# Patient Record
Sex: MALE | Race: WHITE | Employment: FULL TIME | URBAN - METROPOLITAN AREA
[De-identification: names, ages, dates, MRNs, and addresses within clinical notes are randomized per-mention and may not be internally consistent; named-entity substitution may affect disease eponyms.]

---

## 2020-10-28 ENCOUNTER — OFFICE VISIT (OUTPATIENT)
Dept: ORTHOPEDIC SURGERY | Age: 45
End: 2020-10-28
Payer: MEDICAID

## 2020-10-28 VITALS — WEIGHT: 220 LBS | HEIGHT: 72 IN | TEMPERATURE: 98 F | BODY MASS INDEX: 29.8 KG/M2

## 2020-10-28 PROCEDURE — 4004F PT TOBACCO SCREEN RCVD TLK: CPT | Performed by: ORTHOPAEDIC SURGERY

## 2020-10-28 PROCEDURE — G8427 DOCREV CUR MEDS BY ELIG CLIN: HCPCS | Performed by: ORTHOPAEDIC SURGERY

## 2020-10-28 PROCEDURE — 99203 OFFICE O/P NEW LOW 30 MIN: CPT | Performed by: ORTHOPAEDIC SURGERY

## 2020-10-28 PROCEDURE — G8419 CALC BMI OUT NRM PARAM NOF/U: HCPCS | Performed by: ORTHOPAEDIC SURGERY

## 2020-10-28 PROCEDURE — G8484 FLU IMMUNIZE NO ADMIN: HCPCS | Performed by: ORTHOPAEDIC SURGERY

## 2020-10-28 RX ORDER — AMOXICILLIN AND CLAVULANATE POTASSIUM 875; 125 MG/1; MG/1
TABLET, FILM COATED ORAL
COMMUNITY
Start: 2020-09-23 | End: 2022-10-10

## 2020-10-28 RX ORDER — NAPROXEN 500 MG/1
TABLET ORAL
COMMUNITY
Start: 2020-09-23 | End: 2022-10-10

## 2020-10-28 NOTE — PROGRESS NOTES
Alcohol use: Not on file    Drug use: Not on file    Sexual activity: Not on file   Lifestyle    Physical activity     Days per week: Not on file     Minutes per session: Not on file    Stress: Not on file   Relationships    Social connections     Talks on phone: Not on file     Gets together: Not on file     Attends Jehovah's witness service: Not on file     Active member of club or organization: Not on file     Attends meetings of clubs or organizations: Not on file     Relationship status: Not on file    Intimate partner violence     Fear of current or ex partner: Not on file     Emotionally abused: Not on file     Physically abused: Not on file     Forced sexual activity: Not on file   Other Topics Concern    Not on file   Social History Narrative    Not on file     No family history on file. Physical Exam:    Temp 98 °F (36.7 °C)   Ht 6' (1.829 m)   Wt 220 lb (99.8 kg)   BMI 29.84 kg/m²     GENERAL: alert, appears stated age, cooperative, no acute distress    HEENT: Head is normocephalic, atraumatic. PERRLA. SKIN: Clean, dry, intact. There is not any cellulitis or cutaneous lesions noted in the upper extremities    PULMONARY: breathing is regular and unlabored, no acute distress    CV: The bilateral upper and lower extremities are warm and well-perfused with brisk capillary refill. 2+ pulses UE and LE bilateral.     PSYCHIATRY: Pleasant mood, appropriate behavior, follows commands    NEURO: Sensation is intact distally with light touch with no alteration. Motor exam of the upper extremities show elbow flexion and extension, wrist flexion and extension, and finger abduction grossly intact 5/5. Upper extremity reflexes are bilaterally symmetrical and within normal limits. LYMPH: No lymphedema present distally in upper or lower extremity. MUSCULOSKELETAL:  Shoulder Exam:  Examination of the Left shoulder shows: There is not a deformity. There is not erythema.   There is not soft tissue swelling. Deltoid region is  tender to palpation. AC Joint is  tender to palpation. Clavicle is not tender to palpation. Bicipital Groove is  tender to palpation. Pectoralis  is not tender to palpation. Scapula/ trapezius is  tender to palpation. Right:  ROM Full, Strength: Supraspinatus 5/5, Infraspinatus 5/5, Subscapularis 5/5  Left:  /140/60, Strength: Supraspinatus 4/5, Infraspinatus 5/5, Subscapularis 5/5  Right Shoulder:  Crepitus:  no   Tenderness:  none   Effusion:   none   Impingement: negative   Empty Can:  negative   Speed's:  negative      Apprehension:  negative   Cross Arm Sign:  negative   Campbell's:  negative       Neer's:  negative      Belly Press Test:  negative      Drop Arm Test:  negative     Left Shoulder:  Crepitus:  no   Tenderness:  mild   Effusion:   non   Impingement: positive   Empty Can:  positive   Speed's: positive   Apprehension:  not tested   Cross Arm Sign:  positive   Campbell's:  positive      Neer's:  positive      Belly Press Test:  negative   Drop Arm Test:  positive           Imaging:  Xr Elbow Right (2 Views)    Result Date: 10/28/2020  EXAMINATION: TWO XRAY VIEWS OF THE RIGHT ELBOW 10/28/2020 10:20 am COMPARISON: None. HISTORY: ORDERING SYSTEM PROVIDED HISTORY: Pain and swelling of right elbow FINDINGS: There is no elbow effusion. There is no acute fracture or dislocation. Alignment is normal.     No acute abnormality. Xr Shoulder Left (min 2 Views)    Result Date: 10/28/2020  EXAMINATION: THREE XRAY VIEWS OF THE LEFT SHOULDER 10/28/2020 10:04 am COMPARISON: None. HISTORY: ORDERING SYSTEM PROVIDED HISTORY: Left shoulder pain, unspecified chronicity FINDINGS: Glenohumeral joint is normally aligned. No evidence of acute fracture or dislocation. No abnormal periarticular calcifications. The [facility] joint is unremarkable in appearance. Visualized lung is unremarkable. No acute abnormality.          Debra Caraballo was seen today for elbow injury and shoulder pain.    Diagnoses and all orders for this visit:    Cervical radiculopathy  -     Ambulatory referral to [address]    Right lateral epicondylitis        Patient seen and examined. X-rays reviewed. Patient has exam and history consistent with rotator cuff pathology. Patient has relatively good strength of his left shoulder but may complaint is numbness shooting down his arm consistent with cervical radiculopathy. Patient educated about this possibility and the recommendations for referral to physical medicine rehabilitation. Wrist brace for the right tennis elbow.       Jacinto Mi, DO

## 2022-10-10 ENCOUNTER — HOSPITAL ENCOUNTER (EMERGENCY)
Age: 47
Discharge: HOME | End: 2022-10-10
Attending: EMERGENCY MEDICINE
Payer: MEDICAID

## 2022-10-10 VITALS
SYSTOLIC BLOOD PRESSURE: 165 MMHG | BODY MASS INDEX: 31.15 KG/M2 | WEIGHT: 230 LBS | RESPIRATION RATE: 18 BRPM | DIASTOLIC BLOOD PRESSURE: 99 MMHG | HEIGHT: 72 IN | OXYGEN SATURATION: 96 % | TEMPERATURE: 98.2 F | HEART RATE: 71 BPM

## 2022-10-10 DIAGNOSIS — S68.119A AMPUTATION OF FINGER WITHOUT COMPLICATION, INITIAL ENCOUNTER: Primary | ICD-10-CM

## 2022-10-10 PROCEDURE — 2580000003 HC RX 258

## 2022-10-10 PROCEDURE — 2500000003 HC RX 250 WO HCPCS

## 2022-10-10 PROCEDURE — 99284 EMERGENCY DEPT VISIT MOD MDM: CPT

## 2022-10-10 PROCEDURE — 96374 THER/PROPH/DIAG INJ IV PUSH: CPT

## 2022-10-10 PROCEDURE — 6360000002 HC RX W HCPCS

## 2022-10-10 PROCEDURE — 96375 TX/PRO/DX INJ NEW DRUG ADDON: CPT

## 2022-10-10 RX ORDER — CEPHALEXIN 500 MG/1
500 CAPSULE ORAL 4 TIMES DAILY
Qty: 40 CAPSULE | Refills: 0 | Status: SHIPPED | OUTPATIENT
Start: 2022-10-10 | End: 2022-10-20

## 2022-10-10 RX ORDER — LIDOCAINE HYDROCHLORIDE AND EPINEPHRINE 10; 10 MG/ML; UG/ML
20 INJECTION, SOLUTION INFILTRATION; PERINEURAL ONCE
Status: COMPLETED | OUTPATIENT
Start: 2022-10-10 | End: 2022-10-10

## 2022-10-10 RX ORDER — HYDROCODONE BITARTRATE AND ACETAMINOPHEN 5; 325 MG/1; MG/1
1 TABLET ORAL EVERY 4 HOURS PRN
Qty: 12 TABLET | Refills: 0 | Status: SHIPPED | OUTPATIENT
Start: 2022-10-10 | End: 2022-10-13

## 2022-10-10 RX ORDER — FENTANYL CITRATE 50 UG/ML
50 INJECTION, SOLUTION INTRAMUSCULAR; INTRAVENOUS ONCE
Status: COMPLETED | OUTPATIENT
Start: 2022-10-10 | End: 2022-10-10

## 2022-10-10 RX ADMIN — CEFAZOLIN 2000 MG: 2 INJECTION, POWDER, FOR SOLUTION INTRAMUSCULAR; INTRAVENOUS at 21:35

## 2022-10-10 RX ADMIN — LIDOCAINE HYDROCHLORIDE AND EPINEPHRINE 20 ML: 10; 10 INJECTION, SOLUTION INFILTRATION; PERINEURAL at 21:34

## 2022-10-10 RX ADMIN — FENTANYL CITRATE 50 MCG: 50 INJECTION, SOLUTION INTRAMUSCULAR; INTRAVENOUS at 21:13

## 2022-10-11 NOTE — CONSULTS
Department of Orthopedic Surgery  Consult Note    Reason for Consult: Left ankle injury    HISTORY OF PRESENT ILLNESS:       Patient is a 49 y.o. male who presents with pain and bleeding in his left hand. Patient was cutting a piece of wood with a table saw when he accidentally cut his second third and fourth fingers in his left hand. He reports immediate pain and bleeding and reported to the ED also an outside facility where his bleeding was controlled, splint placed [facility] At bedside patient still complains of pain. Patient is right-handed and works as a  for [employer]. He denies any other orthopedic complaint at this time. Past Medical History:    History reviewed. No pertinent past medical history. Past Surgical History:    History reviewed. No pertinent surgical history. Current Medications:   No current facility-administered medications for this encounter. Allergies:  Patient has no known allergies. Social History:   TOBACCO:   reports that he has been smoking cigarettes. He has been smoking an average of 2 packs per day. He has never used smokeless tobacco.  ETOH:   reports that he does not currently use alcohol. DRUGS:   reports no history of drug use. ACTIVITIES OF DAILY LIVING:    OCCUPATION:    Family History:   History reviewed. No pertinent family history.     REVIEW OF SYSTEMS:  CONSTITUTIONAL:  negative for acute fevers, chills  EYES:  negative for acute visual disturbance  HEENT:  negative for acute hearing loss  RESPIRATORY:  negative for acute dyspnea, wheezing  CARDIOVASCULAR:  negative for acute chest pain, palpitations  GASTROINTESTINAL:  negative for acute nausea, vomiting  HEMATOLOGIC/LYMPHATIC: Positive for bleeding  MUSCULOSKELETAL:  positive for  pain  NEUROLOGICAL:  negative for headaches, dizziness  BEHAVIOR/PSYCH:  negative for increased agitation and anxiety    PHYSICAL EXAM:    VITALS:  BP (!) 165/99   Pulse 71   Temp 98.2 °F (36.8 °C) (Oral)   Resp 18   Ht 6' (1.829 m)   Wt 230 lb (104.3 kg)   SpO2 96%   BMI 31.19 kg/m²   CONSTITUTIONAL:  awake, alert, cooperative, mild distress, appears stated age, and normal weight  MUSCULOSKELETAL:  Left upper Extremity:  Degloving injury of the second third and fourth digit of the left hand. Appreciate clinical pictures below  Mild bleeding noted  Intact AIN/PIN/ulnar/median nerve sensation over all digits follow-up  Unable to assess vascular status over middle digit. Unable to assess motor function in second third and fourth digit due to pain  Patient able to flex at wrist elbow and shoulder with associated intact neurovascular status. Secondary Exam:   rightUE: No obvious signs of trauma. -TTP to fingers, hand, wrist, forearm, elbow, humerus, shoulder or clavicle. -- Patient able to flex/extend fingers, wrist, elbow and shoulder with active and passive ROM without pain, +2/4 Radial pulse, cap refill <3sec, +AIN/PIN/Radial/Ulnar/Median N, distal sensation grossly intact to C4-T1 dermatomes, compartments soft and compressible. bilateralLE: No obvious signs of trauma. -TTP to foot, ankle, leg, knee, thigh, hip.-- Patient able to flex/extend toes, ankle, knee and hip with active and passive ROM without pain,+2/4 DP & PT pulses, cap refill <3sec, +5/5 PF/DF/EHL, distal sensation grossly intact to L4-S1 dermatomes, compartments soft and compressible. Pelvis: -TTP, -Log roll, -Heel strike     DATA:    CBC: No results found for: WBC, RBC, HGB, HCT, MCV, MCH, MCHC, RDW, PLT, MPV  PT/INR:  No results found for: PROTIME, INR    Radiology Review:  X-ray from outside facility reviewed. IMPRESSION:  Second and third left digit degloving and nailbed injury  Dorsal fourth digit laceration    PLAN:  Nonweightbearing left upper extremity  Ancef 2 g  Pain control  Bedside irrigation and debridement with temporizing closure of partially amputated segments of the fourth digit. Local sedation was achieved with 1% lidocaine with epinephrine at bedside after Betadine solution was applied to patient's right hand. Patient ring finger was copiously irrigated using 3 L of normal saline. Skin edges were then approximated using 3-0 nylon. A wet gauze was applied, reinforced with ABDs and 4 x 4's. A well-padded splint was then applied. Patient tolerated procedure well.   Follow-up in clinic this week with Dr. Yaneth Carey  Case discussed with attending    Michael Chirinos DO

## 2022-10-11 NOTE — ED PROVIDER NOTES
HPI:  10/10/22, Time: 11:18 PM EDT         Jason Ang is a 49 y.o. male presenting to the ED for hand injury. Patient was cutting a piece of wood with a table saw when he accidentally cut his index finger, ring finger, and middle finger on the left hand. Came on suddenly, nothing makes it better or worse, aching sensation in the finger. He does report he was in outside facility prior to arrival, did receive antibiotics, tetanus was updated, he was transferred here. Denies fever, chills, nausea, vomiting, cough, sputum, paresthesias, or any other symptoms or complaints. Review of Systems:   A complete review of systems was performed and pertinent positives and negatives are stated within HPI, all other systems reviewed and are negative.          --------------------------------------------- PAST HISTORY ---------------------------------------------  Past Medical History:  has no past medical history on file. Past Surgical History:  has no past surgical history on file. Social History:  reports that he has been smoking cigarettes. He has been smoking an average of 2 packs per day. He has never used smokeless tobacco. He reports that he does not currently use alcohol. He reports that he does not use drugs. Family History: family history is not on file. The patients home medications have been reviewed. Allergies: Patient has no known allergies. -------------------------------------------------- RESULTS -------------------------------------------------  All laboratory and radiology results have been personally reviewed by myself   LABS:  No results found for this visit on 10/10/22. RADIOLOGY:  Interpreted by Radiologist.  No orders to display       ------------------------- NURSING NOTES AND VITALS REVIEWED ---------------------------   The nursing notes within the ED encounter and vital signs as below have been reviewed.    BP (!) 165/99   Pulse 71   Temp 98.2 °F (36.8 °C) (Oral) Resp 18   Ht 6' (1.829 m)   Wt 230 lb (104.3 kg)   SpO2 96%   BMI 31.19 kg/m²   Oxygen Saturation Interpretation: Normal      ---------------------------------------------------PHYSICAL EXAM--------------------------------------      Constitutional/General: Alert and oriented x3, well appearing, non toxic in NAD  Head: Normocephalic and atraumatic  Eyes: PERRL, EOMI  Mouth: Oropharynx clear, handling secretions, no trismus  Neck: Supple, full ROM,   Pulmonary: Lungs clear to auscultation bilaterally, no wheezes, rales, or rhonchi. Not in respiratory distress  Cardiovascular:  Regular rate and rhythm, no murmurs, gallops, or rubs. 2+ distal pulses  Abdomen: Soft, non tender, non distended,   Extremities: Moves all extremities x 4. Warm and well perfused. Left hand: Partial amputation of the first, second, and third fingers, just proximal to the nailbeds. Please see photos in chart for further detail  Skin: warm and dry without rash  Neurologic: GCS 15,  Psych: Normal Affect      ------------------------------ ED COURSE/MEDICAL DECISION MAKING----------------------  Medications   fentaNYL (SUBLIMAZE) injection 50 mcg (50 mcg IntraVENous Given 10/10/22 2113)   lidocaine-EPINEPHrine 1 %-1:[id number] injection 20 mL (20 mLs IntraDERmal Given by Other 10/10/22 2134)   ceFAZolin (ANCEF) 2,000 mg in sterile water 20 mL IV syringe (2,000 mg IntraVENous Given 10/10/22 2135)         ED COURSE:       Medical Decision Making:    Ortho to evaluate    Counseling: The emergency provider has spoken with the patient and discussed todays results, in addition to providing specific details for the plan of care and counseling regarding the diagnosis and prognosis. Questions are answered at this time and they are agreeable with the plan.      --------------------------------- IMPRESSION AND DISPOSITION ---------------------------------    IMPRESSION  1.  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  Every effort was made to ensure accuracy; however, inadvertent computerized transcription errors may be present       Avelina Gordon MD  10/10/22 9222

## 2022-10-12 ENCOUNTER — OFFICE VISIT (OUTPATIENT)
Dept: ORTHOPEDIC SURGERY | Age: 47
End: 2022-10-12
Payer: MEDICAID

## 2022-10-12 VITALS — BODY MASS INDEX: 31.15 KG/M2 | HEIGHT: 72 IN | RESPIRATION RATE: 20 BRPM | WEIGHT: 230 LBS

## 2022-10-12 DIAGNOSIS — M79.642 LEFT HAND PAIN: ICD-10-CM

## 2022-10-12 DIAGNOSIS — S62.633B OPEN DISPLACED FRACTURE OF DISTAL PHALANX OF LEFT MIDDLE FINGER, INITIAL ENCOUNTER: ICD-10-CM

## 2022-10-12 DIAGNOSIS — S61.319A LACERATION OF NAIL BED OF FINGER, INITIAL ENCOUNTER: Primary | ICD-10-CM

## 2022-10-12 PROCEDURE — G8428 CUR MEDS NOT DOCUMENT: HCPCS | Performed by: STUDENT IN AN ORGANIZED HEALTH CARE EDUCATION/TRAINING PROGRAM

## 2022-10-12 PROCEDURE — 4004F PT TOBACCO SCREEN RCVD TLK: CPT | Performed by: STUDENT IN AN ORGANIZED HEALTH CARE EDUCATION/TRAINING PROGRAM

## 2022-10-12 PROCEDURE — G8417 CALC BMI ABV UP PARAM F/U: HCPCS | Performed by: STUDENT IN AN ORGANIZED HEALTH CARE EDUCATION/TRAINING PROGRAM

## 2022-10-12 PROCEDURE — G8484 FLU IMMUNIZE NO ADMIN: HCPCS | Performed by: STUDENT IN AN ORGANIZED HEALTH CARE EDUCATION/TRAINING PROGRAM

## 2022-10-12 PROCEDURE — 99205 OFFICE O/P NEW HI 60 MIN: CPT | Performed by: STUDENT IN AN ORGANIZED HEALTH CARE EDUCATION/TRAINING PROGRAM

## 2022-10-12 RX ORDER — OXYCODONE HYDROCHLORIDE AND ACETAMINOPHEN 5; 325 MG/1; MG/1
1 TABLET ORAL EVERY 6 HOURS PRN
Qty: 8 TABLET | Refills: 0 | Status: ON HOLD
Start: 2022-10-12 | End: 2022-10-14 | Stop reason: HOSPADM

## 2022-10-12 NOTE — PROGRESS NOTES
[address]   PRE-ADMISSION TESTING GENERAL INSTRUCTIONS  PAT Phone Number: [phone]      GENERAL INSTRUCTIONS:    [x] Antibacterial Soap Shower Night before and/or AM of surgery. [] CHG Wipes instruction sheet and wipes given. [] Hibiclens shower the night before and the morning of surgery.   -Do not use Hibiclens on your face or head. [x] Do not wear makeup, lotions, powders, deodorant. [x] Nothing by mouth after midnight; including gum, candy, mints, or water. [x] You may brush your teeth, gargle, but do NOT swallow water. [x] No tobacco products, illegal drugs, or alcohol within 24 hours of your surgery. [x] Jewelry or valuables should not be brought to the hospital. All body and/or tongue piercing's must be removed prior to arriving to hospital. No contact lens or hair pins. [x] Arrange transportation with a responsible adult  to and from the hospital. Arrange for someone to be with you for the remainder of the day and for 24 hours after your procedure due to having had anesthesia. -Who will be your  for transportation?____family______________         -Who will be staying with you for 24 hrs after your procedure?___family_______________  [x] Bring insurance card and photo ID.  [] Bring copy of living will or healthcare power of  papers to be placed in your electronic record. [] Urine Pregnancy test will be preformed the day of surgery. A specimen sample may be brought from home. [] Transfusion Bracelet: Please bring with you to hospital, day of surgery. PARKING INSTRUCTIONS:     [x] ARRIVAL DATE & TIME: 10/14 0730  [x] Enter into the [employer]. Two people may accompany you. Masks are not required but are recommended. [x] Parking Lot \"I\" is where you will park. It is located on the corner of [address] [facility] and [facility]. The entrance is on [facility] [address] [facility].    Upon entering the parking lot, a voucher ticket will print    EDUCATION INSTRUCTIONS:        [] Knee or Hip replacement booklet & exercise pamphlets given. [] [address] placed in chart. [] Pre-admission Testing educational folder given  [] Incentive Spirometry,coughing & deep breathing exercises reviewed. [] Medication information sheet(s)   [] Fluoroscopy-Xray used in surgery reviewed with patient. Educational pamphlet placed in chart. [] Pain: Post-op pain is normal and to be expected. You will be asked to rate your pain from 0-10. [] Joint camp offered. [] Joint replacement booklets given.  [] Spine Navigator to see in PAT. [] Other:___________________________    MEDICATION INSTRUCTIONS:    [x] Bring a complete list of your medications, please write the last time you took the medicine, give this list to the nurse in Pre-Op. [] Take only the following medications the morning of surgery with 1-2 ounces of water: percocet if needed  [] Stop all herbal supplements and vitamins 5 days before surgery. Stop NSAIDS 7 days before surgery. [] DO NOT take any diabetic medicine the morning of surgery. Follow instructions for insulin the day before surgery. [] If you are diabetic and your blood sugar is low or you feel symptomatic, you may drink 1-2 ounces of apple juice or take a glucose tablet.            -The morning of your procedure, you may call the pre-op area if you have concerns about your blood sugar [phone]. [] Use your inhalers the morning of surgery. Bring your emergency inhaler with you day of surgery. [] Follow physician instructions regarding any blood thinners you may be taking. WHAT TO EXPECT:    [x] The day of surgery you will be greeted and checked in by the [employer].  In addition, you will be registered in the [place] by a Patient Access Representative. Please bring your photo ID and insurance card.  A nurse will greet you in accordance to the time you are needed in the pre-op area to prepare you for surgery. Please do not be discouraged if you are not greeted in the order you arrive as there are many variables that are involved in patient preparation. Your patience is greatly appreciated as you wait for your nurse. Please bring in items such as: books, magazines, newspapers, electronics, or any other items  to occupy your time in the waiting area. [x]  Delays may occur with surgery and staff will make a sincere effort to keep you informed of delays. If any delays occur with your procedure, we apologize ahead of time for your inconvenience as we recognize the value of your time.

## 2022-10-12 NOTE — PROGRESS NOTES
Surgical Procedure: LEFT HAND EXPLORATION / REPAIR / POSSIBLE REVISION AMPUTATION MIDDLE AND INDEX FINGERS   Anesthesia: General  Date: Friday, October 14, 2022  Time: [place] ( will try to follow first case, the hospital will notify Dr. Josesito Boyd)  Place: [facility]. Surgeon: Artemio Garcia  Medications reviewed with the patient / holding the following medications: Holding no medications. Pre Op Instructions reviewed with the patient and son. Surgery time tentatively scheduled for 9:30 am on October 14 th. This office will inform them of any changes in date or time of surgery. Patient also gave wife's cell number of [phone]. Informed patient: A hospital nurse will contact them with instructions for the day of surgery. The patient expresses understanding and is in agreement with the plan. Post Op Appointment:  To be determined by Dr. Josesito Boyd

## 2022-10-12 NOTE — PROGRESS NOTES
New Wrist/Hand Patient Visit     Referring Provider:   No referring provider defined for this encounter. CHIEF COMPLAINT:   Chief Complaint   Patient presents with    Finger Injury     Patient was cutting a piece of wood with a table saw when he accidentally cut his index finger, ring finger, and middle finger on the left hand 2 days ago         HPI:      Bette Esteves is a 49y.o. year old male who sustained a table saw injury to his left hand 2 days ago. He is right-hand dominant works as a . He had significant soft tissue nailbed injury to the index and middle finger of his left hand. His pain is severe. It is nonradiating. Denies fevers and chills. Denies any other injuries. PAST MEDICAL HISTORY  No past medical history on file. PAST SURGICAL HISTORY  No past surgical history on file. FAMILY HISTORY   No family history on file. SOCIAL HISTORY  Social History     Occupational History    Not on file   Tobacco Use    Smoking status: Every Day     Packs/day: 2.00     Types: Cigarettes    Smokeless tobacco: Never   Substance and Sexual Activity    Alcohol use: Not Currently    Drug use: Never    Sexual activity: Not on file       CURRENT MEDICATIONS     Current Outpatient Medications:     cephALEXin (KEFLEX) 500 MG capsule, Take 1 capsule by mouth 4 times daily for 10 days, Disp: 40 capsule, Rfl: 0    HYDROcodone-acetaminophen (NORCO) 5-325 MG per tablet, Take 1 tablet by mouth every 4 hours as needed for Pain for up to 3 days. Intended supply: 3 days.  Take lowest dose possible to manage pain, Disp: 12 tablet, Rfl: 0    ALLERGIES  No Known Allergies    Controlled Substances Monitoring:        REVIEW OF SYSTEMS:     Constitutional:  Negative for weight loss, fevers, chills, fatigue  Cardiovascular: Negative for chest pain, palpitations  Pulmonary: Negative for shortness of breath, labored breathing, cough  GI: negative for abdominal pain, nausea, vomitting   MSK: per HPI  Skin: negative for rash, open wounds    All other systems reviewed and are negative           PHYSICAL EXAM     Vitals:    10/12/22 1119   Resp: 20   Weight: 230 lb (104.3 kg)   Height: 6' (1.829 m)       Height: 6' (1.829 m)  Weight: [unfilled]  BMI:  Body mass index is 31.19 kg/m². General: The patient is alert and oriented x 3, appears to be stated age and in no distress. HEENT: head is normocephalic, atraumatic. EOMI. Neck: supple, trachea midline, no thyromegaly   Cardiovascular: peripheral pulses palpable. Normal Capillary refill   Respiratory: breathing unlabored, chest expansion symmetric   Skin: no rash, no open wounds, no erythema  Psych: normal affect; mood stable  Neurologic: gait normal, sensation grossly intact in extremities  MSK:        Hand/Wrist:  Left hand: Significant soft tissue injury with nailbed loss of the left middle finger and index finger with sutures in place. Range of motion is limited due to pain however flexor and extensor tendons appear to be intact. No active signs of infection. The remainder of his fingers are atraumatic however there is a dorsal skin laceration superficial of the left ring finger. Hand is warm and well-perfused with palpable radial pulse      IMAGING:     XRAY:  3 views of the left hand reviewed today demonstrate distal phalanx fracture of the index and middle finger of the left hand with comminution    Radiographic findings reviewed with patient    ASSESSMENT  Left hand tablesaw injury with nailbed injury to the left index and middle finger with associated open distal phalanx fractures      PLAN  -Plan discussed in detail with patient today. We are going to take him to the OR on Friday for exploration and repair of damaged structures. If we cannot get any reasonable coverage over his distal phalanx he may require revision amputation specifically of the middle finger.   We talked of the healing process and how this takes a few months for things to go back to normal.  Hopefully we get her back to work after sensitivity and wound started to heal in a glove even though he works as a . All of his questions were answered. Risk-benefit alternatives surgery discussed in detail he wished to proceed.   Schedule surgery on Friday for finger exploration, repair, possible revision amputation          Kassidy Venegas,   Orthopaedic Surgery   10/12/22   11:55 AM EDT

## 2022-10-13 ENCOUNTER — ANESTHESIA EVENT (OUTPATIENT)
Dept: OPERATING ROOM | Age: 47
End: 2022-10-13
Payer: MEDICAID

## 2022-10-14 ENCOUNTER — HOSPITAL ENCOUNTER (OUTPATIENT)
Age: 47
Setting detail: OUTPATIENT SURGERY
Discharge: HOME | End: 2022-10-14
Attending: STUDENT IN AN ORGANIZED HEALTH CARE EDUCATION/TRAINING PROGRAM | Admitting: STUDENT IN AN ORGANIZED HEALTH CARE EDUCATION/TRAINING PROGRAM
Payer: MEDICAID

## 2022-10-14 ENCOUNTER — ANESTHESIA (OUTPATIENT)
Dept: OPERATING ROOM | Age: 47
End: 2022-10-14
Payer: MEDICAID

## 2022-10-14 VITALS
WEIGHT: 230 LBS | SYSTOLIC BLOOD PRESSURE: 136 MMHG | OXYGEN SATURATION: 96 % | BODY MASS INDEX: 31.15 KG/M2 | DIASTOLIC BLOOD PRESSURE: 83 MMHG | TEMPERATURE: 97.3 F | HEIGHT: 72 IN | RESPIRATION RATE: 11 BRPM | HEART RATE: 57 BPM

## 2022-10-14 DIAGNOSIS — S68.623A PARTIAL TRAUMATIC AMPUTATION OF LEFT MIDDLE FINGER THROUGH PHALANX, INITIAL ENCOUNTER: Primary | ICD-10-CM

## 2022-10-14 PROCEDURE — 7100000000 HC PACU RECOVERY - FIRST 15 MIN: Performed by: STUDENT IN AN ORGANIZED HEALTH CARE EDUCATION/TRAINING PROGRAM

## 2022-10-14 PROCEDURE — 3600000016 HC SURGERY LEVEL 6 ADDTL 15MIN: Performed by: STUDENT IN AN ORGANIZED HEALTH CARE EDUCATION/TRAINING PROGRAM

## 2022-10-14 PROCEDURE — 3600000006 HC SURGERY LEVEL 6 BASE: Performed by: STUDENT IN AN ORGANIZED HEALTH CARE EDUCATION/TRAINING PROGRAM

## 2022-10-14 PROCEDURE — 7100000010 HC PHASE II RECOVERY - FIRST 15 MIN: Performed by: STUDENT IN AN ORGANIZED HEALTH CARE EDUCATION/TRAINING PROGRAM

## 2022-10-14 PROCEDURE — 2580000003 HC RX 258: Performed by: STUDENT IN AN ORGANIZED HEALTH CARE EDUCATION/TRAINING PROGRAM

## 2022-10-14 PROCEDURE — 7100000011 HC PHASE II RECOVERY - ADDTL 15 MIN: Performed by: STUDENT IN AN ORGANIZED HEALTH CARE EDUCATION/TRAINING PROGRAM

## 2022-10-14 PROCEDURE — 6360000002 HC RX W HCPCS: Performed by: STUDENT IN AN ORGANIZED HEALTH CARE EDUCATION/TRAINING PROGRAM

## 2022-10-14 PROCEDURE — 3700000001 HC ADD 15 MINUTES (ANESTHESIA): Performed by: STUDENT IN AN ORGANIZED HEALTH CARE EDUCATION/TRAINING PROGRAM

## 2022-10-14 PROCEDURE — 2709999900 HC NON-CHARGEABLE SUPPLY: Performed by: STUDENT IN AN ORGANIZED HEALTH CARE EDUCATION/TRAINING PROGRAM

## 2022-10-14 PROCEDURE — 3700000000 HC ANESTHESIA ATTENDED CARE: Performed by: STUDENT IN AN ORGANIZED HEALTH CARE EDUCATION/TRAINING PROGRAM

## 2022-10-14 PROCEDURE — 6370000000 HC RX 637 (ALT 250 FOR IP): Performed by: STUDENT IN AN ORGANIZED HEALTH CARE EDUCATION/TRAINING PROGRAM

## 2022-10-14 PROCEDURE — 6360000002 HC RX W HCPCS: Performed by: NURSE ANESTHETIST, CERTIFIED REGISTERED

## 2022-10-14 PROCEDURE — 7100000001 HC PACU RECOVERY - ADDTL 15 MIN: Performed by: STUDENT IN AN ORGANIZED HEALTH CARE EDUCATION/TRAINING PROGRAM

## 2022-10-14 PROCEDURE — 2500000003 HC RX 250 WO HCPCS: Performed by: STUDENT IN AN ORGANIZED HEALTH CARE EDUCATION/TRAINING PROGRAM

## 2022-10-14 PROCEDURE — 2500000003 HC RX 250 WO HCPCS: Performed by: NURSE ANESTHETIST, CERTIFIED REGISTERED

## 2022-10-14 PROCEDURE — 6360000002 HC RX W HCPCS: Performed by: ANESTHESIOLOGY

## 2022-10-14 PROCEDURE — 6370000000 HC RX 637 (ALT 250 FOR IP): Performed by: NURSE ANESTHETIST, CERTIFIED REGISTERED

## 2022-10-14 RX ORDER — MIDAZOLAM HYDROCHLORIDE 1 MG/ML
INJECTION INTRAMUSCULAR; INTRAVENOUS PRN
Status: DISCONTINUED | OUTPATIENT
Start: 2022-10-14 | End: 2022-10-14 | Stop reason: SDUPTHER

## 2022-10-14 RX ORDER — DEXAMETHASONE SODIUM PHOSPHATE 10 MG/ML
INJECTION INTRAMUSCULAR; INTRAVENOUS PRN
Status: DISCONTINUED | OUTPATIENT
Start: 2022-10-14 | End: 2022-10-14 | Stop reason: SDUPTHER

## 2022-10-14 RX ORDER — DIAPER,BRIEF,INFANT-TODD,DISP
EACH MISCELLANEOUS PRN
Status: DISCONTINUED | OUTPATIENT
Start: 2022-10-14 | End: 2022-10-14 | Stop reason: ALTCHOICE

## 2022-10-14 RX ORDER — SODIUM CHLORIDE 9 MG/ML
INJECTION, SOLUTION INTRAVENOUS CONTINUOUS
Status: DISCONTINUED | OUTPATIENT
Start: 2022-10-14 | End: 2022-10-14 | Stop reason: HOSPADM

## 2022-10-14 RX ORDER — ROCURONIUM BROMIDE 10 MG/ML
INJECTION, SOLUTION INTRAVENOUS PRN
Status: DISCONTINUED | OUTPATIENT
Start: 2022-10-14 | End: 2022-10-14 | Stop reason: SDUPTHER

## 2022-10-14 RX ORDER — GLYCOPYRROLATE 0.2 MG/ML
INJECTION INTRAMUSCULAR; INTRAVENOUS PRN
Status: DISCONTINUED | OUTPATIENT
Start: 2022-10-14 | End: 2022-10-14 | Stop reason: SDUPTHER

## 2022-10-14 RX ORDER — SODIUM CHLORIDE 0.9 % (FLUSH) 0.9 %
5-40 SYRINGE (ML) INJECTION PRN
Status: DISCONTINUED | OUTPATIENT
Start: 2022-10-14 | End: 2022-10-14 | Stop reason: HOSPADM

## 2022-10-14 RX ORDER — FENTANYL CITRATE 50 UG/ML
INJECTION, SOLUTION INTRAMUSCULAR; INTRAVENOUS PRN
Status: DISCONTINUED | OUTPATIENT
Start: 2022-10-14 | End: 2022-10-14 | Stop reason: SDUPTHER

## 2022-10-14 RX ORDER — OXYCODONE HYDROCHLORIDE AND ACETAMINOPHEN 5; 325 MG/1; MG/1
1 TABLET ORAL EVERY 6 HOURS PRN
Qty: 28 TABLET | Refills: 0 | Status: SHIPPED | OUTPATIENT
Start: 2022-10-14 | End: 2022-10-21

## 2022-10-14 RX ORDER — LIDOCAINE HYDROCHLORIDE 20 MG/ML
INJECTION, SOLUTION INTRAVENOUS PRN
Status: DISCONTINUED | OUTPATIENT
Start: 2022-10-14 | End: 2022-10-14 | Stop reason: SDUPTHER

## 2022-10-14 RX ORDER — LIDOCAINE HYDROCHLORIDE AND EPINEPHRINE 10; 10 MG/ML; UG/ML
INJECTION, SOLUTION INFILTRATION; PERINEURAL PRN
Status: DISCONTINUED | OUTPATIENT
Start: 2022-10-14 | End: 2022-10-14 | Stop reason: ALTCHOICE

## 2022-10-14 RX ORDER — PROPOFOL 10 MG/ML
INJECTION, EMULSION INTRAVENOUS PRN
Status: DISCONTINUED | OUTPATIENT
Start: 2022-10-14 | End: 2022-10-14 | Stop reason: SDUPTHER

## 2022-10-14 RX ORDER — SODIUM CHLORIDE 9 MG/ML
INJECTION, SOLUTION INTRAVENOUS PRN
Status: DISCONTINUED | OUTPATIENT
Start: 2022-10-14 | End: 2022-10-14 | Stop reason: HOSPADM

## 2022-10-14 RX ORDER — SODIUM CHLORIDE 0.9 % (FLUSH) 0.9 %
5-40 SYRINGE (ML) INJECTION EVERY 12 HOURS SCHEDULED
Status: DISCONTINUED | OUTPATIENT
Start: 2022-10-14 | End: 2022-10-14 | Stop reason: HOSPADM

## 2022-10-14 RX ORDER — ONDANSETRON 2 MG/ML
INJECTION INTRAMUSCULAR; INTRAVENOUS PRN
Status: DISCONTINUED | OUTPATIENT
Start: 2022-10-14 | End: 2022-10-14 | Stop reason: SDUPTHER

## 2022-10-14 RX ORDER — ALBUTEROL SULFATE 90 UG/1
AEROSOL, METERED RESPIRATORY (INHALATION) PRN
Status: DISCONTINUED | OUTPATIENT
Start: 2022-10-14 | End: 2022-10-14 | Stop reason: SDUPTHER

## 2022-10-14 RX ORDER — NEOSTIGMINE METHYLSULFATE 1 MG/ML
INJECTION, SOLUTION INTRAVENOUS PRN
Status: DISCONTINUED | OUTPATIENT
Start: 2022-10-14 | End: 2022-10-14 | Stop reason: SDUPTHER

## 2022-10-14 RX ADMIN — DEXAMETHASONE SODIUM PHOSPHATE 10 MG: 10 INJECTION INTRAMUSCULAR; INTRAVENOUS at 09:36

## 2022-10-14 RX ADMIN — FENTANYL CITRATE 50 MCG: 50 INJECTION, SOLUTION INTRAMUSCULAR; INTRAVENOUS at 09:53

## 2022-10-14 RX ADMIN — CEFAZOLIN 2000 MG: 2 INJECTION, POWDER, FOR SOLUTION INTRAMUSCULAR; INTRAVENOUS at 09:28

## 2022-10-14 RX ADMIN — FENTANYL CITRATE 100 MCG: 50 INJECTION, SOLUTION INTRAMUSCULAR; INTRAVENOUS at 09:23

## 2022-10-14 RX ADMIN — PROPOFOL 140 MG: 10 INJECTION, EMULSION INTRAVENOUS at 09:23

## 2022-10-14 RX ADMIN — ONDANSETRON 4 MG: 2 INJECTION INTRAMUSCULAR; INTRAVENOUS at 10:08

## 2022-10-14 RX ADMIN — GLYCOPYRROLATE 0.3 MG: 0.2 INJECTION INTRAMUSCULAR; INTRAVENOUS at 10:11

## 2022-10-14 RX ADMIN — SODIUM CHLORIDE: 9 INJECTION, SOLUTION INTRAVENOUS at 07:50

## 2022-10-14 RX ADMIN — ALBUTEROL SULFATE 3 PUFF: 90 AEROSOL, METERED RESPIRATORY (INHALATION) at 10:08

## 2022-10-14 RX ADMIN — ROCURONIUM BROMIDE 50 MG: 10 INJECTION, SOLUTION INTRAVENOUS at 09:23

## 2022-10-14 RX ADMIN — Medication 1.5 MG: at 10:11

## 2022-10-14 RX ADMIN — HYDROMORPHONE HYDROCHLORIDE 0.5 MG: 1 INJECTION, SOLUTION INTRAMUSCULAR; INTRAVENOUS; SUBCUTANEOUS at 11:01

## 2022-10-14 RX ADMIN — HYDROMORPHONE HYDROCHLORIDE 0.5 MG: 1 INJECTION, SOLUTION INTRAMUSCULAR; INTRAVENOUS; SUBCUTANEOUS at 11:11

## 2022-10-14 RX ADMIN — LIDOCAINE HYDROCHLORIDE 60 MG: 20 INJECTION, SOLUTION INTRAVENOUS at 09:23

## 2022-10-14 RX ADMIN — MIDAZOLAM 2 MG: 1 INJECTION INTRAMUSCULAR; INTRAVENOUS at 09:15

## 2022-10-14 ASSESSMENT — PAIN SCALES - GENERAL
PAINLEVEL_OUTOF10: 8
PAINLEVEL_OUTOF10: 8

## 2022-10-14 ASSESSMENT — PAIN DESCRIPTION - ORIENTATION
ORIENTATION: LEFT
ORIENTATION: LEFT

## 2022-10-14 ASSESSMENT — PAIN DESCRIPTION - PAIN TYPE
TYPE: SURGICAL PAIN
TYPE: SURGICAL PAIN

## 2022-10-14 ASSESSMENT — LIFESTYLE VARIABLES: SMOKING_STATUS: 1

## 2022-10-14 ASSESSMENT — PAIN DESCRIPTION - DESCRIPTORS
DESCRIPTORS: THROBBING

## 2022-10-14 ASSESSMENT — PAIN DESCRIPTION - LOCATION
LOCATION: HAND
LOCATION: HAND

## 2022-10-14 ASSESSMENT — PAIN - FUNCTIONAL ASSESSMENT: PAIN_FUNCTIONAL_ASSESSMENT: 0-10

## 2022-10-14 NOTE — ANESTHESIA POSTPROCEDURE EVALUATION
Department of Anesthesiology  Postprocedure Note    Patient: Josue Arroyo  MRN: [id number]  YOB: 1975  Date of evaluation: 10/14/2022      Procedure Summary     Date: 10/14/22 [facility] / [address]    Anesthesia Start: 6245 Anesthesia Stop: 3102    Procedure: LEFT HAND EXPLORATION AND REPAIR OF NAIL BED LACERATIONS AND IRRIGATION AND DEBRIDEMENT (Right: Hand) Diagnosis:       Laceration of nail bed of finger, initial encounter      [facility]    Surgeons: Nandini Bowen DO Responsible Provider: Radha Demarco MD    Anesthesia Type: General ASA Status: 3          Anesthesia Type: General    Katie Phase I: Katie Score: 10    Katie Phase II:        Anesthesia Post Evaluation    Patient location during evaluation: PACU  Patient participation: complete - patient participated  Level of consciousness: awake  Pain score: 3  Airway patency: patent  Nausea & Vomiting: no nausea and no vomiting  Complications: no  Cardiovascular status: blood pressure returned to baseline  Respiratory status: acceptable  Hydration status: euvolemic

## 2022-10-14 NOTE — BRIEF OP NOTE
Brief Postoperative Note      Patient: Bharati Garcia  YOB: 1975  MRN: [id number]    Date of Procedure: 10/14/2022    Pre-Op Diagnosis: Amputation to the left index and middle finger    Post-Op Diagnosis: Same       Procedure(s):  Left index finger nailbed repair and laceration repair with irrigation debridement, left middle finger revision amputation    Surgeon(s):  Colten Gu DO    Assistant:  * No surgical staff found *    Anesthesia: General    Estimated Blood Loss (mL): Minimal    Complications: None    Specimens:   * No specimens in log *    Implants:  * No implants in log *      Drains: * No LDAs found *    Findings: Revision amputation middle finger, nailbed repair and laceration repair with irrigation debridement index finger    Electronically signed by El Rubinstein, DO on 10/14/2022 at 10:15 AM

## 2022-10-14 NOTE — H&P
Patient seen again in preop holding this morning. His left hand was marked with my initials. All questions were answered in detail informed consent was reviewed and signed with the patient. He wishes to proceed with treatment after risk-benefit alternatives were discussed    CHIEF COMPLAINT:        Chief Complaint   Patient presents with    Finger Injury       Patient was cutting a piece of wood with a table saw when he accidentally cut his index finger, ring finger, and middle finger on the left hand 2 days ago          HPI:       Barbara Gee is a 49y.o. year old male who sustained a table saw injury to his left hand 2 days ago. He is right-hand dominant works as a . He had significant soft tissue nailbed injury to the index and middle finger of his left hand. His pain is severe. It is nonradiating. Denies fevers and chills. Denies any other injuries. PAST MEDICAL HISTORY  Past Medical History   No past medical history on file. PAST SURGICAL HISTORY  Past Surgical History   No past surgical history on file. FAMILY HISTORY   Family History   No family history on file. SOCIAL HISTORY  Social History            Occupational History    Not on file   Tobacco Use    Smoking status: Every Day       Packs/day: 2.00       Types: Cigarettes    Smokeless tobacco: Never   Substance and Sexual Activity    Alcohol use: Not Currently    Drug use: Never    Sexual activity: Not on file         CURRENT MEDICATIONS     Current Medication      Current Outpatient Medications:     cephALEXin (KEFLEX) 500 MG capsule, Take 1 capsule by mouth 4 times daily for 10 days, Disp: 40 capsule, Rfl: 0    HYDROcodone-acetaminophen (NORCO) 5-325 MG per tablet, Take 1 tablet by mouth every 4 hours as needed for Pain for up to 3 days. Intended supply: 3 days.  Take lowest dose possible to manage pain, Disp: 12 tablet, Rfl: 0        ALLERGIES  No Known Allergies     Controlled Substances Monitoring: REVIEW OF SYSTEMS:      Constitutional:  Negative for weight loss, fevers, chills, fatigue  Cardiovascular: Negative for chest pain, palpitations  Pulmonary: Negative for shortness of breath, labored breathing, cough  GI: negative for abdominal pain, nausea, vomitting   MSK: per HPI  Skin: negative for rash, open wounds     All other systems reviewed and are negative               PHYSICAL EXAM      Vitals       Vitals:     10/12/22 1119   Resp: 20   Weight: 230 lb (104.3 kg)   Height: 6' (1.829 m)            Height: 6' (1.829 m)  Weight: [unfilled]  BMI:  Body mass index is 31.19 kg/m². General: The patient is alert and oriented x 3, appears to be stated age and in no distress. HEENT: head is normocephalic, atraumatic. EOMI. Neck: supple, trachea midline, no thyromegaly   Cardiovascular: peripheral pulses palpable. Normal Capillary refill   Respiratory: breathing unlabored, chest expansion symmetric   Skin: no rash, no open wounds, no erythema  Psych: normal affect; mood stable  Neurologic: gait normal, sensation grossly intact in extremities  MSK:           Hand/Wrist:  Left hand: Significant soft tissue injury with nailbed loss of the left middle finger and index finger with sutures in place. Range of motion is limited due to pain however flexor and extensor tendons appear to be intact. No active signs of infection. The remainder of his fingers are atraumatic however there is a dorsal skin laceration superficial of the left ring finger.   Hand is warm and well-perfused with palpable radial pulse        IMAGING:      XRAY:  3 views of the left hand reviewed today demonstrate distal phalanx fracture of the index and middle finger of the left hand with comminution     Radiographic findings reviewed with patient     ASSESSMENT  Left hand tablesaw injury with nailbed injury to the left index and middle finger with associated open distal phalanx fractures        PLAN  -Plan discussed in detail with patient today. We are going to take him to the OR on Friday for exploration and repair of damaged structures. If we cannot get any reasonable coverage over his distal phalanx he may require revision amputation specifically of the middle finger. We talked of the healing process and how this takes a few months for things to go back to normal.  Hopefully we get her back to work after sensitivity and wound started to heal in a glove even though he works as a . All of his questions were answered. Risk-benefit alternatives surgery discussed in detail he wished to proceed.   Schedule surgery on Friday for finger exploration, repair, possible revision amputation

## 2022-10-14 NOTE — DISCHARGE INSTRUCTIONS
Orthopedic surgery discharge instructions  Nonweightbearing left upper extremity. Please keep splint in place until your follow-up appointment. Please keep your splint clean and dry at all times.   Elevate your hand above the level of your heart to decrease pain and swelling  Take pain medications as prescribed  Call the office with any questions and to find out when your follow-up appointment has been scheduled

## 2022-10-14 NOTE — PROGRESS NOTES
1027  transferred ton  PACU post I&D of fingers. Dressing dry. Exposed fingers warm with brisk refill. Appropriate color. Elevated on pillow. 1120 discharge instructions given . Patient verbalizes understanding. Discharged via  wheelchair.

## 2022-10-14 NOTE — ANESTHESIA PRE PROCEDURE
Department of Anesthesiology  Preprocedure Note       Name:  Armaan Willis   Age:  49 y.o.  :  1975                                          MRN:  [id number]         Date:  10/14/2022      Surgeon: Darline Malik):  Gunner Teran DO    Procedure: Procedure(s):  LEFT HAND EXPLORATION, REPAIR, POSSIBLE REVISION, AMPUTATION OF MIDDLE AND INDEX FINGERS    Medications prior to admission:   Prior to Admission medications    Medication Sig Start Date End Date Taking? Authorizing Provider   oxyCODONE-acetaminophen (PERCOCET) 5-325 MG per tablet Take 1 tablet by mouth every 6 hours as needed for Pain for up to 7 days. Intended supply: 7 days. Take lowest dose possible to manage pain 10/14/22 10/21/22 Yes Kaleb Giron DO   cephALEXin (KEFLEX) 500 MG capsule Take 1 capsule by mouth 4 times daily for 10 days 10/10/22 10/20/22  Dale Martinez MD       Current medications:    Current Facility-Administered Medications   Medication Dose Route Frequency Provider Last Rate Last Admin    0.9 % sodium chloride infusion   IntraVENous Continuous Gunner Teran,  125 mL/hr at 10/14/22 0750 New Bag at 10/14/22 0750    sodium chloride flush 0.9 % injection 5-40 mL  5-40 mL IntraVENous 2 times per day Gunner Teran,         sodium chloride flush 0.9 % injection 5-40 mL  5-40 mL IntraVENous PRN Gunner Teran, DO        0.9 % sodium chloride infusion   IntraVENous PRN Gunner Teran,         ceFAZolin (ANCEF) 2,000 mg in sterile water 20 mL IV syringe  2,000 mg IntraVENous On Call to Vandana Lou 8, DO           Allergies:  No Known Allergies    Problem List:  There is no problem list on file for this patient. Past Medical History:  History reviewed. No pertinent past medical history.     Past Surgical History:        Procedure Laterality Date    APPENDECTOMY         Social History:    Social History     Tobacco Use    Smoking status: Every Day     Packs/day: 2.00     Types: Cigarettes    Smokeless tobacco: Never   Substance Use Topics    Alcohol use: Not Currently                                Ready to quit: Not Answered  Counseling given: Not Answered      Vital Signs (Current):   Vitals:    10/14/22 0736   BP: (!) 148/99   Pulse: 71   Resp: 17   Temp: 36.6 °C (97.8 °F)   TempSrc: Temporal   SpO2: 98%   Weight: 230 lb (104.3 kg)   Height: 6' (1.829 m)                                              BP Readings from Last 3 Encounters:   10/14/22 (!) 148/99   10/10/22 (!) 165/99       NPO Status: Time of last liquid consumption: 2330                        Time of last solid consumption: 2045                        Date of last liquid consumption: 10/13/22                        Date of last solid food consumption: 10/13/22    BMI:   Wt Readings from Last 3 Encounters:   10/14/22 230 lb (104.3 kg)   10/12/22 230 lb (104.3 kg)   10/10/22 230 lb (104.3 kg)     Body mass index is 31.19 kg/m². CBC: No results found for: WBC, RBC, HGB, HCT, MCV, RDW, PLT    CMP: No results found for: NA, K, CL, CO2, BUN, CREATININE, GFRAA, AGRATIO, LABGLOM, GLUCOSE, GLU, PROT, CALCIUM, BILITOT, ALKPHOS, AST, ALT    POC Tests: No results for input(s): POCGLU, POCNA, POCK, POCCL, POCBUN, POCHEMO, POCHCT in the last 72 hours.     Coags: No results found for: PROTIME, INR, APTT    HCG (If Applicable): No results found for: PREGTESTUR, PREGSERUM, HCG, HCGQUANT     ABGs: No results found for: PHART, PO2ART, FXZ9QDX, PID3XAX, BEART, Z3CQLZYC     Type & Screen (If Applicable):  No results found for: LABABO, LABRH    Drug/Infectious Status (If Applicable):  No results found for: HIV, HEPCAB    COVID-19 Screening (If Applicable): No results found for: COVID19        Anesthesia Evaluation  Patient summary reviewed and Nursing notes reviewed no history of anesthetic complications:   Airway: Mallampati: III  TM distance: >3 FB   Neck ROM: full  Mouth opening: > = 3 FB   Dental:    (+) upper dentures and lower dentures      Pulmonary:   (+) COPD: decreased breath sounds: bilateral current smoker          Patient smoked on day of surgery. Cardiovascular:Negative CV ROS  Exercise tolerance: good (>4 METS),   (+) hyperlipidemia        Rhythm: regular  Rate: normal           Beta Blocker:  Not on Beta Blocker      ROS comment: Patient had a heart catheterization roughly 8 months ago d/t SOB and fatigue. No intervention were made. Neuro/Psych:   Negative Neuro/Psych ROS              GI/Hepatic/Renal:   (+) hiatal hernia, GERD: poorly controlled,           Endo/Other: Negative Endo/Other ROS   (+) : arthritis (Systemic):., .                 Abdominal:             Vascular: negative vascular ROS. Other Findings:           Anesthesia Plan      general     ASA 3     (20 gauge IV right hand)  Induction: intravenous. MIPS: Postoperative opioids intended and Prophylactic antiemetics administered. Anesthetic plan and risks discussed with patient. Use of blood products discussed with patient whom consented to blood products. Plan discussed with CRNA and attending. Jesenia Natarajan RN   10/14/2022  Chart reviewed pt id agree with plan CONOR Garcia - CRNA      Pt seen, examined, chart reviewed, plan discussed.   John Kumari MD  10/14/2022  11:21 AM

## 2022-10-14 NOTE — OP NOTE
Operative Note      Patient: Jason Ang  YOB: 1975  MRN: [id number]    Date of Procedure: 10/14/2022    Pre-Op Diagnosis: Partial amputation to the left index and middle finger    Post-Op Diagnosis: Same       Procedure(s):  Left index finger nailbed repair and laceration repair with irrigation debridement, left middle finger revision amputation    Surgeon(s):  Wendy Scales DO    Assistant:   * No surgical staff found *    Anesthesia: General    Estimated Blood Loss (mL): Minimal    Complications: None    Specimens:   * No specimens in log *    Implants:  * No implants in log *      Drains: * No LDAs found *    Findings: See op note below    Detailed Description of Procedure:   Whit Urrutia is a 67-year-old male seen in my office for partial amputation to his left index and middle finger which he sustained at work. Risk-benefit alternatives surgery discussed patient we wish to proceed to the OR for exploration with revision amputation and nailbed repair with irrigation debridement. He was seen in preoperative holding on day of surgery and my initials were placed on his left hand. Informed consent was reviewed and signed with the patient and his family. He was rolled to the operative suite and placed supine on the OR table. General anesthesia was induced. Preoperative antibiotics were infused. Digital block was performed by myself with 1% lidocaine with epinephrine on the index and ring finger. Left hand was then prepped and draped in standard sterile orthopedic fashion. Appropriate timeout was performed confirming left hand be the site of surgery. Tourniquet was inflated 250 mmHg. Sutures were removed from his index and middle finger. He had an extreme crush injury with soft tissue laceration and tissue loss with comminuted fracture of the distal phalanx of left middle finger.   Left middle finger distal phalanx was rongeured back to clean margins with care to leave the terminal flexor tendon attached to the base. The germinal and sterile matrix of the nail was sharply debrided with 15 blade scalpel. The devitalized tip of the finger was carefully debrided to create a nice volar flap. This finger was then copiously irrigated with sterile saline. The volar flap was closed over the top of the dorsum of the finger covering the bone with a nice finger stump remaining. This was closed with 4-0 chromic suture. Attention was then turned to the index finger. The nail was removed. The finger was copiously irrigated with sterile saline. There is a laceration along the nail bed and the ulnar border of the tip of the finger. This was probed and felt to be viable tissue. Laceration of the tip of the finger was closed in 4-0 chromic suture. The nailbed was closed using 5-0 chromic suture. A piece of the chromic suture foil was cut and placed under his germinal matrix to allow his nail to regrow. Both fingers were then dressed with bacitracin ointment, 4 x 4's, Kerlix, and he was placed in a well-padded radial gutter splint. Tourniquet was released and hemostasis noted to be adequate. He is awake from anesthesia and transferred PACU in stable condition. He tolerated procedure well. Will be discharged home with appropriate pain medications. He will follow-up in my office in 1 week for a wound check. All instructions are in the chart.     Electronically signed by Day Velez DO on 10/14/2022 at 10:16 AM

## 2022-10-24 ENCOUNTER — OFFICE VISIT (OUTPATIENT)
Dept: ORTHOPEDIC SURGERY | Age: 47
End: 2022-10-24

## 2022-10-24 VITALS — HEIGHT: 72 IN | BODY MASS INDEX: 30.48 KG/M2 | WEIGHT: 225 LBS

## 2022-10-24 DIAGNOSIS — S61.319A LACERATION OF NAIL BED OF FINGER, INITIAL ENCOUNTER: Primary | ICD-10-CM

## 2022-10-24 PROCEDURE — 99024 POSTOP FOLLOW-UP VISIT: CPT | Performed by: STUDENT IN AN ORGANIZED HEALTH CARE EDUCATION/TRAINING PROGRAM

## 2022-10-24 NOTE — LETTER
[address] [facility].  [address] [address]  Phone: [phone]  Fax: [phone] [facility] [place],[address], DO        October 24, 2022     Patient: Kelsi Turner   YOB: 1975   Date of Visit: 10/24/2022       To Whom It May Concern: It is my medical opinion that Kelsi Turner may return to work for light duty on 10/26/2022. He is to keep his left hand clean and dry and not use this for any heavy activity    If you have any questions or concerns, please don't hesitate to call.     Sincerely,        Keila Beltran DO   Orthopaedic Surgery   10/24/22  9:44 AM

## 2022-10-24 NOTE — PROGRESS NOTES
Follow Up Post Operative Visit     Surgery: Left index finger nailbed repair left middle finger irrigation debridement revision amputation  Date: 10/14/2022    Subjective:    Zakia Nayak is here for follow up visit s/p above procedure. He is doing well. He has been in a splint since his surgery. His pain is well controlled. He has been compliant with his postoperative restrictions. Denies fever and chills. Controlled Substances Monitoring:        Physical Exam:    Height: 6' (1.829 m), Weight: 225 lb (102.1 kg)    General: Alert and oriented x3, no acute distress  Cardiovascular/pulmonary: No labored breathing, peripheral perfusion intact  Musculoskeletal:    Left hand: Sutures in place in his index finger nailbed. Nail foil has been removed. He has decreased range of motion in his index and middle finger PIP and DIP joints. Sutures in place and the flap of the left middle finger appear to be well approximated without any signs of infection. Flap appears healthy. Sensation to light touch is intact distally in both his middle and index finger. Hand warm and well-perfused. No signs of infection      Imaging: No new imaging today    Assessment and Plan: 10 days status post left index finger nailbed repair with left middle finger irrigation debridement and revision imitation  -Dressing change today. Everything is to be healing appropriately. We will allow this to continue. I discussed with him how long it takes for nail to regrow. The revision amputation flap appears healthy and healing. Sutures will fall out on their own. We will allow him to keep his fingers dry for another couple weeks. He can soak his hand in warm soapy water to remove his dressing each day and do daily dry dressing change. We will try to keep the fingers dry after this. He can start working on range of motion of his PIP and DIP joints. I told him that if he gets stiff at his PIP joint it is difficult problem deal with. At this point we will start warm soapy water soaks after his next follow-up appointment. All of his questions were answered in detail.   Return to work for light duty note provided today    Gilma Alanis, DO   Orthopaedic Surgery   10/24/22   9:24 AM EDT

## 2022-11-09 ENCOUNTER — OFFICE VISIT (OUTPATIENT)
Dept: ORTHOPEDIC SURGERY | Age: 47
End: 2022-11-09

## 2022-11-09 VITALS — WEIGHT: 225 LBS | BODY MASS INDEX: 30.48 KG/M2 | HEIGHT: 72 IN

## 2022-11-09 DIAGNOSIS — S62.633B OPEN DISPLACED FRACTURE OF DISTAL PHALANX OF LEFT MIDDLE FINGER, INITIAL ENCOUNTER: Primary | ICD-10-CM

## 2022-11-09 PROCEDURE — 99024 POSTOP FOLLOW-UP VISIT: CPT | Performed by: STUDENT IN AN ORGANIZED HEALTH CARE EDUCATION/TRAINING PROGRAM

## 2022-11-09 RX ORDER — PANTOPRAZOLE SODIUM 40 MG/1
TABLET, DELAYED RELEASE ORAL
COMMUNITY
Start: 2021-05-07

## 2022-11-09 NOTE — PROGRESS NOTES
Follow Up Post Operative Visit     Surgery: Left index finger nailbed repair left middle finger irrigation debridement revision amputation  Date: 10/14/2022    Subjective:    Maurice Barillas is here for follow up visit s/p above procedure. He is doing well. He has been out of his splint. He is back to work. He is keeping hand clean and dry at work. He is having some stiffness and pain in his PIP joints of the index and ring finger. Denies fever and chills. Denies numbness and tingling. Controlled Substances Monitoring:        Physical Exam:    Height: 6' (1.829 m), Weight: 225 lb (102.1 kg)    General: Alert and oriented x3, no acute distress  Cardiovascular/pulmonary: No labored breathing, peripheral perfusion intact  Musculoskeletal:    Left hand: Sutures in place in his index finger nailbed. Continues to have restricted range of motion his PIP joint but can flex to about 45 degrees. Full extension. Sutures in place and the flap of the left middle finger appear to be well approximated without any signs of infection. Flap appears healthy. Sensation to light touch is intact distally in both his middle and index finger. Hand warm and well-perfused. No signs of infection      Imaging: No new imaging today    Assessment and Plan: Approximately 3 weeks status post left index finger nailbed repair with left middle finger irrigation debridement and revision imitation  -He is continuing to heal appropriately. I am slightly concerned about his PIP joint contractures. Wearing a set of occupational therapy for hand range of motion. He is able to get these fingers wet now. I want him to start soaking in warm soapy water and start working on range of motion. We will see him back in 1 month. Continue to keep his hand clean and dry at work. He is happy with his results thus far. I would just like him to get a little bit more range of motion of his fingers.   All of his questions were answered in detail.       Hubert Narvaez DO   Orthopaedic Surgery   10/24/22   9:24 AM EDT

## 2022-11-15 ENCOUNTER — EVALUATION (OUTPATIENT)
Dept: OCCUPATIONAL THERAPY | Age: 47
End: 2022-11-15
Payer: MEDICAID

## 2022-11-15 DIAGNOSIS — S62.633B OPEN DISPLACED FRACTURE OF DISTAL PHALANX OF LEFT MIDDLE FINGER, INITIAL ENCOUNTER: Primary | ICD-10-CM

## 2022-11-15 PROCEDURE — 97110 THERAPEUTIC EXERCISES: CPT | Performed by: OCCUPATIONAL THERAPIST

## 2022-11-15 PROCEDURE — 97165 OT EVAL LOW COMPLEX 30 MIN: CPT | Performed by: OCCUPATIONAL THERAPIST

## 2022-11-15 NOTE — PROGRESS NOTES
OCCUPATIONAL THERAPY INITIAL EVALUATION    [address]  [facility] [address]  Dept: [phone]  Loc: [phone]   Mara Coffey [facility] OT Fax: [phone]    Date:  11/15/2022  Initial Evaluation Date: 11/15/2022   Evaluating Therapist: Mackenzie Strauss OT    Patient Name:  Karie Yuan    :  1975    Restrictions/Precautions:  none noted - stitches still in , low  fall risk  Diagnosis:  L MF P-3 fx with nailbed repair, tip amputation , IF tip laceration. S62.633B (ICD-10-CM) - Open displaced fracture of distal phalanx of left middle finger, initial encounter      Date of Surgery/Injury:   injury 10/10 22  , sx 10/14/2022 ( 4 weeks post op)    Insurance/Certification information:  [address] [place] [address] of care signed (Y/N): N  Visit# / total visits:  -     Referring Practitioner:  Dr. Sullivan Pod  Specific Practitioner Orders: none noted. Evaluate and treat as indicated. Assessment of current deficits   [] Functional mobility  [x] ADLs  [x] Strength   [] Cognition   [] Functional transfers   [] IADLs  [] Safety Awareness  [] Endurance   [x] Fine Motor Coordination  [] Balance  [] Vision/perception  [x] Sensation    [] Gross Motor Coordination [x] ROM  [x] Pain   [x] Edema    [x] Scar Adhesion/Skin Integrity     OT PLAN OF CARE   OT POC based on physician orders, patient diagnosis and results of clinical assessment    Frequency/Duration:  2 x's a week for 12 - 16 sessions.    Specific OT Treatment to include:     [x] Instruction in HEP                   Modalities:  [x] Therapeutic Exercise        [] Ultrasound               [] Electrical Stimulation/Attended  [x] PROM/Stretching                    [x] Fluidotherapy          [x]  Paraffin                   [] AAROM  [x] AROM                 [] Iontophoresis:   [x] Tendon Glides                                               [] Neuromuscular Re-Ed            [] ADL/IADL re-training [x] Therapeutic Activity       [x] Pain Management with/without modalities PRN                 [x] Manual Therapy                      [] Splinting                      [x] Scar Management                   []Joint Protection/Training  []Ergonomics                             [x] Joint Mobilization        [] Adaptive Equipment Assessment/Training                             [x] Manual Edema Mobilization   [] Myofascial Release                 [] Energy Conservation/Work Simplification  [x] GM/FM Coordination       [] Safety retraining/education per  individual diagnosis/goals  [x] Desensitization       Patient Specific Goal: To use his L hand again - move his IF and MF normally                             GOALS (Long term same as Short term):  ) Patient will demonstrate good understanding of home program (exercises/activities/diagnosis/prognosis/goals) with good accuracy. 2. ) Patient will demonstrate increased active/passive range of motion of their Left IF and MF to [facility] for ADL/IADL completion. 3. ) Patient will demonstrate increased /pinch strength of at least 10 / 5 pinch pounds of their Left hand. 4. ) Patient to report decreased pain in their affected Left upper extremity from 4/10 to 1/10 or less with resistive functional use. 5. ) Increase in fine motor function as evidenced by decreased time to complete 9-hole peg test by  at least 10 - 20  seconds. 6. ) Patient to report a decrease in hypersensitivity from 80%  to 20% or less in their L MF and IF tips. 7. ) Patient to demonstrate decreased guarding of their affected extremity from 100% to 20% or less. 8. ) Patient will be knowledgeable of edema control techniques as evident with decreases from slight +  to mild/none. 9. ) Patient will report ADL functions as Mod I/I using L UE> .   10 ) Patient will decrease QuickDASH score to 30% or less for increased participation in daily functional activities.      Past Medical History: No past medical history on file. Past Surgical History:   Past Surgical History:   Procedure Laterality Date    APPENDECTOMY      HAND SURGERY Right 10/14/2022    LEFT HAND EXPLORATION AND REPAIR OF NAIL BED LACERATIONS AND IRRIGATION AND DEBRIDEMENT performed by Mick Acosta DO at [address] [place] [address]       Reason for Referral: Pt went to the ED on 10/10 2022. He had a table saw injury - cutting a piece of wood he accidentally cut his IF, MF and RF tips  of his L hand. He has a P-3 fx in the MF and had to have sx for partial tip amputation. 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 He has nothing on his fingers but he states he usually wears gauze around them and then the metal cages on his IF and MF - but they are uncomfortable. Home Living: lives  in a house with  his wife and 3 children at home - 9 kids all togetehr. Prior Level of Function: Inedep. Cognition:   Alert/Oriented x3     IADL STATUS:   Ind Mod I Min A Mod A Max A Dep Other   Homemaking Responsibility    x      Shopping Responsibility:  x        Mode of Transportation: x         Leisure & Hobbies:      x    Work:      x      Comments:   Works at [employer] -he is currently  on light duty - using  R hand  only. 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 He usually 40 hours a week. Pt wife is the primary cook and . Pt and sons do all the uyard work . Hobbies include working on cars. ADL STATUS:   Ind Mod I Min A Mod A Max A Dep Other   Feeding: x         Grooming: x         Bathing: x         UE Dressing: x         LE Dressing: x         Toileting: x         Transfers: x           Comments: Pt is using his R dominant hand only for tasks at this time.      Pain Level: Pain a rest is 0/10 - he  frerqunetly gets numbness in tips of IF thru RF. Pain in his PIP jts ^'es to 4- 5/10 when he works them. He has more pain in his IF then his MF. He is taking nothing for pain. Juan Diego Simms UE Assessment: Pt has AROM WNL's in his L shoulder, elbow, forearm and wrist.  Digital ROM is WNL in all fingers but his IF and MF - limitations as follows:      AROM [x]         PROM[]         IF  Digit MCP Extension/Flexion   0-45 H /* 0/86*       PIP Extension/Flexion   0*/100* 0/53*       DIP Extension/Flexion   0*/70-90* 0/18*         MF Digit MCP Extension/Flexion   0-45 H /* 0/90*       PIP Extension/Flexion   0*/100* 0/70*       DIP Extension/Flexion   0*/70-90* wiggle        Comment: Hand Dominance is Right     Sensation: Pt c.o of frequent numbness in the tips of his IF thru MF. Sensation appears intact. Pt does have moderate + hypersensitivity in the tips of of his IF and MF>     Edema Description/Circumferential Measurements:   Pt has slight + edema in his IF and MF.    CIrcum. Measurements    IF          MF                             PIP jt     8.0 cm   8.0 cm                             DIP jt     6.7 cm    7.1 cm    Dynamometer (setting 2):     Left: NT      Right: NT    Pinch Meter:   Lateral: Left= NT,Right= NT    Palmar 3 point: Left= NT, Right= NT    9 Hole Peg Test:   Left:  : 53.11 - 3 pegs only - using IF to thumb  only - but therapist encouraged him to use IF instead of his RF. Pt got frustrated. Right:  : 20.76    QuickDASH Score: 66% disability    Intervention: Pt entered with his fingers undressed. Dissolving stitches all intact - pt has scabs around all, dried skin. Therapist left a note for Dr Aleta Crocker to request if we can take the stitches out this week - as we cannot progress with skin care, desensitization with these in place. Therapist fitted pt with a mesh finger sleeve twisted at the tip and doubled to close the finger tip. Pt said they felt good. Sleeves to be worn to decresae edema, protect skin and decrease pain. Wife shown how to put on. HEP:  pt shown AROM for MP flex/ ext all fingers together to do 4 x's a day. Blocked PIP flex/ ext and DIP flex/ ext  for IF and MF  - PIP jt to be done 6 - 8 x's a day and DIP 4 - 6 x's a day. Pt appeared to understand all instruction. Pt appeared to understand all instructions. Goals were mutually agreed upon with the patient. Eval Complexity: low level eval charged, there exercise x's one   Profile and History- Chart reviewed and history form pt   Assessment of Occupational Performance and Identification of Deficits- 8   Clinical Decision Making- no additional modifications required    Rehab Potential:                                 [x] Good  [] Fair  [] Poor        Suggested Professional Referral:       [x] No  [] Yes:  Barriers to Goal Achievement[de-identified]          [x] No  [] Yes:  Domestic Concerns:                           [x] No  [] Yes:       Patient. Education:  [x] Plans/Goals, Risks/Benefits discussed  [x] Home exercise program  Method of Education: [x] Verbal  [x] Demo  [x] Written  Comprehension of Education:  [x] Verbalizes understanding. [x] Demonstrates understanding. [] Needs Review. [] Demonstrates/verbalizes understanding of HEP/Ed previously given.         Patient understands diagnosis/prognosis and consents to treatment, plan and goals: [x] Yes    [] No     Goal Formulation: Patient  Time In: 4: 10 pm             Time Out: 4: 50 pm                       Timed Code Treatment Minutes: 40 minutes    CPT Codes requested for additional visits:  03534, [id number],  83017, 74676, 77354, 21148, 30052     CODE  Minutes  Units   67576 OT Eval Low 30 1   06608 OT Eval Medium     83709 OT Eval High     68573 Fluidotherapy     97230 Manual     84584 Therapeutic Ex 10 1   12964 Therapeutic Activity     14645 ADL/COMP Tech Train     00246 Neuromuscular Re-Ed     91884 OrthoManagementTraining     65405 Paraffin     [id number] Electrical Stim - Attended     [id number] Iontophoresis     [id number] Ultrasound      Other                Electronically signed by: Sheryl Bell, OT/MUMTAZ, CHT  # [phone]      [id number] Certification / Comments      Frequency/Duration 1-2 x's a week  / week for 12 - 16  visits. Certification period From: this date   To: 2/15/2022     I have reviewed the Plan of Care established for skilled therapy services and certify that the services are required and that they will be provided while the patient is under my care. Physician's Comments/Revisions:                   Physicians's Printed Name:  Dr. Jose Soriano                                    Physician's Signature:                                                               Date:      Please review Patient's OT evaluation and if you agree sign/date and fax back to us at our [address] [place] [address] OT Fax: [phone].  Thank you for your referral!

## 2022-11-16 ENCOUNTER — TELEPHONE (OUTPATIENT)
Dept: ORTHOPEDIC SURGERY | Age: 47
End: 2022-11-16

## 2022-11-16 NOTE — TELEPHONE ENCOUNTER
[address] [facility] left note this morning, asking if patient's dissolving sutures can be removed left middle finger, they are dissolving but are thick and will take months to come out. States they need to progress with taking scabs off - desensitization - using this  hand etc.      Per Dr Donahue Purchase to remove.

## 2022-11-17 ENCOUNTER — TREATMENT (OUTPATIENT)
Dept: OCCUPATIONAL THERAPY | Age: 47
End: 2022-11-17
Payer: MEDICAID

## 2022-11-17 DIAGNOSIS — S62.633B OPEN DISPLACED FRACTURE OF DISTAL PHALANX OF LEFT MIDDLE FINGER, INITIAL ENCOUNTER: Primary | ICD-10-CM

## 2022-11-17 PROCEDURE — 97110 THERAPEUTIC EXERCISES: CPT | Performed by: OCCUPATIONAL THERAPIST

## 2022-11-17 PROCEDURE — 97140 MANUAL THERAPY 1/> REGIONS: CPT | Performed by: OCCUPATIONAL THERAPIST

## 2022-11-17 NOTE — PROGRESS NOTES
Activity                  [x] Pain Management with/without modalities PRN                 [x] Manual Therapy                      [] Splinting                                   [x] Scar Management                   []Joint Protection/Training  []Ergonomics                             [x] Joint Mobilization                      [] Adaptive Equipment Assessment/Training                             [x] Manual Edema Mobilization   [] Myofascial Release                 [] Energy Conservation/Work Simplification  [x] GM/FM Coordination                [] Safety retraining/education per  individual diagnosis/goals  [x] Desensitization        Patient Specific Goal: To use his L hand again - move his IF and MF normally                             GOALS (Long term same as Short term):  ) Patient will demonstrate good understanding of home program (exercises/activities/diagnosis/prognosis/goals) with good accuracy. 2. ) Patient will demonstrate increased active/passive range of motion of their Left IF and MF to [facility] for ADL/IADL completion. 3. ) Patient will demonstrate increased /pinch strength of at least 10 / 5 pinch pounds of their Left hand. 4. ) Patient to report decreased pain in their affected Left upper extremity from 4/10 to 1/10 or less with resistive functional use. 5. ) Increase in fine motor function as evidenced by decreased time to complete 9-hole peg test by  at least 10 - 20  seconds. 6. ) Patient to report a decrease in hypersensitivity from 80%  to 20% or less in their L MF and IF tips. 7. ) Patient to demonstrate decreased guarding of their affected extremity from 100% to 20% or less. 8. ) Patient will be knowledgeable of edema control techniques as evident with decreases from slight +  to mild/none.    9. ) Patient will report ADL functions as Mod I/I using L UE> .   10 ) Patient will decrease QuickDASH score to 30% or less for increased participation in daily functional activities  Plan of care signed (Y/N):  Y    Pain Level:  2-4/10 - tenderness in the tips. Painful with stitch removal but down to 3/10 by end of session. Subjective: \"I'm worried about getting these stitches out - are you sure we can? .\"     Objective:  Updated POC to be completed by visit number 10. INTERVENTION: COMPLETED: SPECIFICS/COMMENTS:   Modality:               AROM:     L digital X  X  X  X  X  x - MP flex/ ext all fingers together - 15 reps   -blocked PIP flex/ ext - IF after PROM  - blocked PIP flex. / ext - MF after PROM -   - blocked DIP flex/ ext IF  - blocked DIP flex/ ext MF.  - flat fist - 10 rep's              AAROM:               PROM/Stretching:     L digital  PIP jt  x  - IF and MF before doing AROM blocked        Scar Mass/Edema Control:     Retrograde massage x IF and MF - not on scar/ incision areas        Strengthening:               Other:     HEP  x Revewied all with pt - see above under AROM and PROM to PIP jts. ,added wound care see below   Wound care x Sterile saline soaks once a day for 5 min - air out. Stitches removes today per DR bejarano.  Cont to keep covered when going out and at work. Assessment/Comments: Pt is making Fair + progress toward stated plan of care. Pt was nervous about getting stiches out but was able to tolerate with occasional breaks. Therapist soaked in sterile saline first - pt given sterile saline to soak once a day for 5 min to encourage the scabs to come off. Therapist reveiwed all his exercises - instructed to push with a little more force actively at the PIP jts. Pt showed ^'ed PIP flexion in his IF from 53*  to 61* and his MF from 70*  to 73*. Pt was glad to get his stitches out after it was done. Will progress as tolerated.      -Rehab Potential: Good  -Requires OT Follow Up: Yes  Time In:4:00 p            Time Out: 4:50 p             Visit #: 2    Treatment Charges: Mins Units   Modalities     Ther Exercise 35 2   Manual Therapy 15 1 Thera Activities     ADL/Home Mgt      Neuro Re-education     Gait Training     Group Therapy     Non-Billable Service Time     Other     Total Time/Units 50 3       -Response to Treatment: Pt anxious to get stitches out but tolerated fair. Goals: Goals for pt can be see on initial eval occurring on 11/ 15/2022    Plan:   [x]  Continues Plan of care: Treatment covered based on POC and graduated to patient's progress. Pt education continues at each visit to obtain maximum benefits from skilled OT intervention.   []  Alter Plan of care:   []  Discharge:      Yue Post OT /L, CHT  # [phone]

## 2022-11-29 ENCOUNTER — TREATMENT (OUTPATIENT)
Dept: OCCUPATIONAL THERAPY | Age: 47
End: 2022-11-29
Payer: MEDICAID

## 2022-11-29 DIAGNOSIS — S62.633B OPEN DISPLACED FRACTURE OF DISTAL PHALANX OF LEFT MIDDLE FINGER, INITIAL ENCOUNTER: Primary | ICD-10-CM

## 2022-11-29 PROCEDURE — 97140 MANUAL THERAPY 1/> REGIONS: CPT | Performed by: OCCUPATIONAL THERAPIST

## 2022-11-29 PROCEDURE — 97110 THERAPEUTIC EXERCISES: CPT | Performed by: OCCUPATIONAL THERAPIST

## 2022-11-29 NOTE — PROGRESS NOTES
OCCUPATIONAL THERAPY Daily  NOTE    Date:  2022  Initial Evaluation Date: 11/15/2022    Patient Name:  Dayron Frey    :  1975    Restrictions/Precautions:  none noted - stitches still in , low  fall risk  Diagnosis:  L MF P-3 fx with nailbed repair, tip amputation , IF tip laceration. S62.633B (ICD-10-CM) - Open displaced fracture of distal phalanx of left middle finger, initial encounter                                                Date of Surgery/Injury:   injury 10/10 22  , sx 10/14/2022 ( 4 weeks post op)     Insurance/Certification information:  [address] [place] [address] signed (Y/N): N  Visit# / total visits: 3 / 12 -    ( 16 visits approved from 2022 - 2023)     Referring Practitioner:  Dr. Diaz Thomas  Specific Practitioner Orders: none noted. Evaluate and treat as indicated. Assessment of current deficits   [] Functional mobility             [x] ADLs           [x] Strength                  [] Cognition   [] Functional transfers           [] IADLs          [] Safety Awareness  [] Endurance   [x] Fine Motor Coordination    [] Balance      [] Vision/perception    [x] Sensation     [] Gross Motor Coordination [x] ROM           [x] Pain                        [x] Edema          [x] Scar Adhesion/Skin Integrity      OT PLAN OF CARE   OT POC based on physician orders, patient diagnosis and results of clinical assessment     Frequency/Duration:  2 x's a week for 12 - 16 sessions.    Specific OT Treatment to include:      [x] Instruction in HEP                   Modalities:  [x] Therapeutic Exercise                 [] Ultrasound               [] Electrical Stimulation/Attended  [x] PROM/Stretching                    [x] Fluidotherapy          [x]  Paraffin                   [] AAROM  [x] AROM                 [] Iontophoresis:   [x] Tendon Glides                                               [] Neuromuscular Re-Ed            [] ADL/IADL re-training    [x] Therapeutic Activity                  [x] Pain Management with/without modalities PRN                 [x] Manual Therapy                      [] Splinting                                   [x] Scar Management                   []Joint Protection/Training  []Ergonomics                             [x] Joint Mobilization                      [] Adaptive Equipment Assessment/Training                             [x] Manual Edema Mobilization   [] Myofascial Release                 [] Energy Conservation/Work Simplification  [x] GM/FM Coordination                [] Safety retraining/education per  individual diagnosis/goals  [x] Desensitization        Patient Specific Goal: To use his L hand again - move his IF and MF normally                             GOALS (Long term same as Short term):  ) Patient will demonstrate good understanding of home program (exercises/activities/diagnosis/prognosis/goals) with good accuracy. 2. ) Patient will demonstrate increased active/passive range of motion of their Left IF and MF to [facility] for ADL/IADL completion. 3. ) Patient will demonstrate increased /pinch strength of at least 10 / 5 pinch pounds of their Left hand. 4. ) Patient to report decreased pain in their affected Left upper extremity from 4/10 to 1/10 or less with resistive functional use. 5. ) Increase in fine motor function as evidenced by decreased time to complete 9-hole peg test by  at least 10 - 20  seconds. 6. ) Patient to report a decrease in hypersensitivity from 80%  to 20% or less in their L MF and IF tips. 7. ) Patient to demonstrate decreased guarding of their affected extremity from 100% to 20% or less. 8. ) Patient will be knowledgeable of edema control techniques as evident with decreases from slight +  to mild/none.    9. ) Patient will report ADL functions as Mod I/I using L UE> .   10 ) Patient will decrease QuickDASH score to 30% or less for increased participation in daily functional activities  Plan of care signed (Y/N):  Y    Pain Level:  2-4/10 - tenderness in the tips. Subjective: \"My scabs have pretty much come off this MF - the tips are real tender. \"     Objective:  Updated POC to be completed by visit number 10. INTERVENTION: COMPLETED: SPECIFICS/COMMENTS:   Modality:     MHP x - to soften tissue and scar areas before massage and activities. AROM:     L digital X    X  X  X    X  X  X    X    x - MP flex/ ext all fingers together - 15 reps- can D/C this exercises   -blocked PIP flex/ ext - IF after PROM  - blocked PIP flex. / ext - MF after PROM -  -  blocked PIP flex/e xt all fingers together   - blocked DIP flex/ ext IF  - blocked DIP flex/ ext MF.  - flat fist - 10 rep's and added hook fist and full fist today. - curl around highlighter - in hook fist- back of hand on table 10 rep's   - beans grasp and release with 5 sec holds - 15 rep's                AAROM:               PROM/Stretching:     L digital  PIP jt  X  x  - IF and MF before doing AROM blocked  - full fist PROM - gentle        Scar Mass/Edema Control:     Retrograde massage x IF and MF - tips    Scar massage X  x - both IF and MF tips/    - fitted with LG finger gel sleeve to wear on MF - try 10 - 15 min then next few days before wearing longer. Strengthening:               Other:     desensitization X  X  x -IF and MF -  tips rub on towel   - beans - all finger tips  - bum chums - remove with IF and thumb only - could not tolerate with MF - 10 rep's     HEP  x Revewied all with pt - see above under AROM and PROM to PIP jts. ,added wound care see below   Wound care x Sterile saline soaks once a day for 5 min - air out. Removes per DR bejarano.  Cont to keep covered when going out and at work. Assessment/Comments: Pt is making Fair + progress toward stated plan of care. Pt showing closed incision areas in both fingers- no drainage.   Pt started desensitization which he tolerated fair in his IF and fair -in his MF. PT showing ^'ed finger mobility in all fingers. Therapist fitted pt with a finger gel sleeve to wear on MF - to mold finger and scar manangment. Pt did have a few raised - nodules areas on his MF when removed. Therapist is unsure they were there before or they were caused by a reaction to the gel sleeves. Pt took a picture of his finger - to compare after he wears the gel pad - only to wear 10 - 15 min the next 2 days. Will report next session on if he tolerated them. Will progress as tolerated. -Rehab Potential: Good  -Requires OT Follow Up: Yes  Time In:4:00 p            Time Out: 4:50 p             Visit #: 3    Treatment Charges: Mins Units   Modalities MHP 5 0   Ther Exercise 30 2   Manual Therapy 15 1   Thera Activities     ADL/Home Mgt      Neuro Re-education     Gait Training     Group Therapy     Non-Billable Service Time     Other     Total Time/Units 45 3       -Response to Treatment: Pt pleased with his ^'ed motion. Goals: Goals for pt can be see on initial eval occurring on 11/ 15/2022    Plan:   [x]  Continues Plan of care: Treatment covered based on POC and graduated to patient's progress. Pt education continues at each visit to obtain maximum benefits from skilled OT intervention.   []  Alter Plan of care:   []  Discharge:      Erik Whitfield OT /MUMTAZ, CHT  # [phone]

## 2022-12-05 ENCOUNTER — TREATMENT (OUTPATIENT)
Dept: OCCUPATIONAL THERAPY | Age: 47
End: 2022-12-05

## 2022-12-05 DIAGNOSIS — S62.633B OPEN DISPLACED FRACTURE OF DISTAL PHALANX OF LEFT MIDDLE FINGER, INITIAL ENCOUNTER: Primary | ICD-10-CM

## 2022-12-05 NOTE — PROGRESS NOTES
OCCUPATIONAL THERAPY DAILY NOTE    Date:  2022  Initial Evaluation Date: 11/15/2022    Patient Name:  Radha Li    :  1975    Restrictions/Precautions:  none noted - stitches still in , low  fall risk  Diagnosis:  L MF P-3 fx with nailbed repair, tip amputation , IF tip laceration. S62.633B (ICD-10-CM) - Open displaced fracture of distal phalanx of left middle finger, initial encounter                                                Date of Surgery/Injury:   injury 10/10 22  , sx 10/14/2022 ( 7 weeks post op)     Insurance/Certification information:  [address] [place] [address] signed (Y/N): Y-signed electronically  Visit# / total visits:  - 16   ( 16 visits approved from 2022 - 2023)     Referring Practitioner:  Dr. Hubert Narvaez  Specific Practitioner Orders: none noted. Evaluate and treat as indicated. Assessment of current deficits   [] Functional mobility             [x] ADLs           [x] Strength                  [] Cognition   [] Functional transfers           [] IADLs          [] Safety Awareness  [] Endurance   [x] Fine Motor Coordination    [] Balance      [] Vision/perception    [x] Sensation     [] Gross Motor Coordination [x] ROM           [x] Pain                        [x] Edema          [x] Scar Adhesion/Skin Integrity      OT PLAN OF CARE   OT POC based on physician orders, patient diagnosis and results of clinical assessment     Frequency/Duration:  2 x's a week for 12 - 16 sessions.      Specific OT Treatment to include:   [x] Instruction in HEP                   Modalities:  [x] Therapeutic Exercise                 [] Ultrasound               [] Electrical Stimulation/Attended  [x] PROM/Stretching                    [x] Fluidotherapy          [x]  Paraffin                   [] AAROM  [x] AROM                 [] Iontophoresis:   [x] Tendon Glides                                               [] Neuromuscular Re-Ed            [] ADL/IADL re-training    [x] Therapeutic Activity                  [x] Pain Management with/without modalities PRN                 [x] Manual Therapy                      [] Splinting                                   [x] Scar Management                   []Joint Protection/Training  []Ergonomics                             [x] Joint Mobilization                      [] Adaptive Equipment Assessment/Training                             [x] Manual Edema Mobilization   [] Myofascial Release                 [] Energy Conservation/Work Simplification  [x] GM/FM Coordination                [] Safety retraining/education per  individual diagnosis/goals  [x] Desensitization        Patient Specific Goal: To use his L hand again - move his IF and MF normally                             GOALS (Long term same as Short term):  ) Patient will demonstrate good understanding of home program (exercises/activities/diagnosis/prognosis/goals) with good accuracy. 2. ) Patient will demonstrate increased active/passive range of motion of their Left IF and MF to [facility] for ADL/IADL completion. 3. ) Patient will demonstrate increased /pinch strength of at least 10 / 5 pinch pounds of their Left hand. 4. ) Patient to report decreased pain in their affected Left upper extremity from 4/10 to 1/10 or less with resistive functional use. 5. ) Increase in fine motor function as evidenced by decreased time to complete 9-hole peg test by  at least 10 - 20  seconds. 6. ) Patient to report a decrease in hypersensitivity from 80%  to 20% or less in their L MF and IF tips. 7. ) Patient to demonstrate decreased guarding of their affected extremity from 100% to 20% or less. 8. ) Patient will be knowledgeable of edema control techniques as evident with decreases from slight +  to mild/none.    9. ) Patient will report ADL functions as Mod I/I using L UE> .   10 ) Patient will decrease QuickDASH score to 30% or less for increased participation in daily functional activities    TODAY'S TREATMENT     Pain Level:  2-4/10 - tenderness in the tips. Subjective: Pt reported that he has been working on his desensitization program.     Objective:  Updated POC to be completed by visit number 10. INTERVENTION: COMPLETED: SPECIFICS/COMMENTS:   Modality:     MHP x - to soften tissue and scar areas before massage and activities. AROM/AAROM:     L digital X  X  X  X  X  X    X     - blocked PIP flex/ext - IF after PROM  - blocked PIP flex/ext - MF after PROM -  - blocked PIP flex/ext all fingers together   - blocked DIP flex/ ext IF  - blocked DIP flex/ ext MF.  - flat fist - 10 rep's and added hook fist and full fist today. - curl around highlighter - in hook fist- back of hand on table 10 rep's   - beans grasp and release with 5 sec holds - 15 rep's      Fine Motor Coordination X    X    X  -Remove bunchems from palm of hand using tip pinch pull aways  -In-hand maliha manipulation (collect 6 then work to tip of IF to place thru slot)  -FM Splint rings: thread back and forth between IF and MF. PROM/Stretching:     L digital  PIP jt  X  x  - IF and MF before doing AROM blocked  - full fist PROM - gentle        Scar Mass/Edema Control:     Retrograde massage x IF and MF - tips    Scar massage X   - both IF and MF tips/    - fitted with LG finger gel sleeve to wear on MF - try 10 - 15 min then next few days before wearing longer. Strengthening:               Other:     desensitization           X  -IF and MF -  tips rub on towel   - beans - all finger tips  - bunchems - remove with IF and thumb only - could not tolerate with MF - 10 rep's    -Desensitized with sensory brush (bristles and sponge), vibrations, tapping, various textures. HEP  x Revewied all with pt - see above under AROM and PROM to PIP jts. ,added wound care see below   Wound care  Sterile saline soaks once a day for 5 min - air out.   Removes per DR bejarano.  Cont to keep covered when going out and at work.      Assessment/Comments: Pt is making Fair + progress toward stated plan of care. Gel sleeve going well - pt reports no complaints and is up to full time wear. He feels the intensity of sensitivity over the stump of the MF is slowly improving. Introduced new techniques this date with fair+ tolerance for vibrations and sensory brush. Introduced fine motor tasks this date to challenge hand manipulation and re-train digital involuntary function - educated on how to add to HEP with good understanding. Will continue to progress as tolerated. -Rehab Potential: Good  -Requires OT Follow Up: Yes    Time In: 4:00 p            Time Out: 4:50 p             Visit #: 4  Treatment Charges: Mins Units   Modalities MHP 5 0   Ther Exercise 20 1   Manual Therapy 15 1   Thera Activities 10 1   ADL/Home Mgt      Neuro Re-education     Gait Training     Group Therapy     Non-Billable Service Time     Other     Total Time/Units 45 3     -Response to Treatment: Pt felt challenged with FM tasks today. Plan:   [x]  Continues Plan of care: Treatment covered based on POC and graduated to patient's progress. Pt education continues at each visit to obtain maximum benefits from skilled OT intervention.   []  Alter Plan of care:   []  Discharge:      Cece Gee OT R/L, [address], #[id number]

## 2022-12-07 ENCOUNTER — OFFICE VISIT (OUTPATIENT)
Dept: ORTHOPEDIC SURGERY | Age: 47
End: 2022-12-07

## 2022-12-07 VITALS — WEIGHT: 225 LBS | BODY MASS INDEX: 30.48 KG/M2 | HEIGHT: 72 IN

## 2022-12-07 DIAGNOSIS — S62.633B OPEN DISPLACED FRACTURE OF DISTAL PHALANX OF LEFT MIDDLE FINGER, INITIAL ENCOUNTER: Primary | ICD-10-CM

## 2022-12-07 NOTE — PROGRESS NOTES
Follow Up Post Operative Visit     Surgery: Left index finger nailbed repair left middle finger irrigation debridement revision amputation  Date: 10/14/2022    Subjective:    Bharati Garcia is here for follow up visit s/p above procedure. He is doing well. His pain is improved. He still has some sensitivity at the middle finger however he is healing up appropriately and happy with his results. He is back to work on light duty. He wants to go back to work full duty next week. Controlled Substances Monitoring:        Physical Exam:    Height: 6' (1.829 m), Weight: 225 lb (102.1 kg)    General: Alert and oriented x3, no acute distress  Cardiovascular/pulmonary: No labored breathing, peripheral perfusion intact  Musculoskeletal:    Left hand: His range of motion is near full with mild limited flexion at his PIP joint of both fingers. .  Full extension. Both fingers have healed appropriately. His index finger nail has regrown. Sensation intact light touch on radial ulnar border of both digits. No signs of infection. Fingers are warm and well-perfused. Imaging: No new imaging today    Assessment and Plan: He is approximately 7 weeks status post surgery above. He is healed up appropriately. He is happy with his results. We are going to return to work to full duty on Monday. He can follow-up on an as-needed basis. All of his questions were answered in detail. He is happy with his results.       El Rubinstein DO   Orthopaedic Surgery   12/7/22  9:52 AM

## 2022-12-07 NOTE — LETTER
[facility].  [address] [address]  Phone: [phone]  Fax: [phone] [facility],[address], DO        December 7, 2022     Patient: Luana Arreguin   YOB: 1975   Date of Visit: 12/7/2022       To Whom It May Concern: It is my medical opinion that Luana Arreguin may return to work full duty on 12/12/2022    If you have any questions or concerns, please don't hesitate to call.     Sincerely,        Zac Topete DO

## (undated) DEVICE — READY WET SKIN SCRUB TRAY-LF: Brand: MEDLINE INDUSTRIES, INC.

## (undated) DEVICE — PADDING,UNDERCAST,COTTON, 3X4YD STERILE: Brand: MEDLINE

## (undated) DEVICE — ELECTRODE PT RET AD L9FT HI MOIST COND ADH HYDRGEL CORDED

## (undated) DEVICE — GLOVE ORANGE PI 8 1/2   MSG9085

## (undated) DEVICE — BNDG,ELSTC,MATRIX,STRL,3"X5YD,LF,HOOK&LP: Brand: MEDLINE

## (undated) DEVICE — DRAPE,HAND,STERILE: Brand: MEDLINE

## (undated) DEVICE — SET,IRRIGATION,CYSTO,Y-TYPE,90": Brand: MEDLINE

## (undated) DEVICE — UPPER EXTREMITY: Brand: MEDLINE INDUSTRIES, INC.

## (undated) DEVICE — GLOVE SURG SZ 85 L12IN FNGR ORTHO 126MIL CRM LTX FREE

## (undated) DEVICE — TOWEL,OR,DSP,ST,BLUE,DLX,10/PK,8PK/CS: Brand: MEDLINE

## (undated) DEVICE — BANDAGE,GAUZE,BULKEE II,2.25"X3YD,STRL: Brand: MEDLINE

## (undated) DEVICE — ZIMMER® STERILE DISPOSABLE TOURNIQUET CUFF WITH PROTECTIVE SLEEVE AND PLC, DUAL PORT, SINGLE BLADDER, 18 IN. (46 CM)

## (undated) DEVICE — PADDING,UNDERCAST,COTTON, 4"X4YD STERILE: Brand: MEDLINE

## (undated) DEVICE — 4-PORT MANIFOLD: Brand: NEPTUNE 2